# Patient Record
Sex: MALE | URBAN - METROPOLITAN AREA
[De-identification: names, ages, dates, MRNs, and addresses within clinical notes are randomized per-mention and may not be internally consistent; named-entity substitution may affect disease eponyms.]

---

## 2023-02-03 ENCOUNTER — OFFICE VISIT (OUTPATIENT)
Dept: URBAN - METROPOLITAN AREA CLINIC 96 | Facility: CLINIC | Age: 43
End: 2023-02-03
Payer: COMMERCIAL

## 2023-02-03 ENCOUNTER — WEB ENCOUNTER (OUTPATIENT)
Dept: URBAN - METROPOLITAN AREA CLINIC 96 | Facility: CLINIC | Age: 43
End: 2023-02-03

## 2023-02-03 VITALS
WEIGHT: 156 LBS | BODY MASS INDEX: 21.13 KG/M2 | DIASTOLIC BLOOD PRESSURE: 86 MMHG | SYSTOLIC BLOOD PRESSURE: 132 MMHG | HEART RATE: 72 BPM | HEIGHT: 72 IN | TEMPERATURE: 98.7 F

## 2023-02-03 DIAGNOSIS — R19.4 CHANGE IN BOWEL HABITS: ICD-10-CM

## 2023-02-03 DIAGNOSIS — D64.9 NORMOCYTIC ANEMIA: ICD-10-CM

## 2023-02-03 DIAGNOSIS — Z83.71 FAMILY HISTORY OF COLONIC POLYPS: ICD-10-CM

## 2023-02-03 DIAGNOSIS — R10.32 LEFT LOWER QUADRANT ABDOMINAL PAIN: ICD-10-CM

## 2023-02-03 DIAGNOSIS — R93.5 ABNORMAL CT OF THE ABDOMEN: ICD-10-CM

## 2023-02-03 DIAGNOSIS — R19.8 IRREGULAR BOWEL HABITS: ICD-10-CM

## 2023-02-03 PROCEDURE — 99204 OFFICE O/P NEW MOD 45 MIN: CPT | Performed by: INTERNAL MEDICINE

## 2023-02-03 RX ORDER — SODIUM, POTASSIUM,MAG SULFATES 17.5-3.13G
177 ML SOLUTION, RECONSTITUTED, ORAL ORAL AS DIRECTED
Qty: 1 BOX | Refills: 0 | OUTPATIENT
Start: 2023-02-03 | End: 2023-02-04

## 2023-02-03 RX ORDER — DICYCLOMINE HYDROCHLORIDE 10 MG/1
1 CAPSULES CAPSULE ORAL
Qty: 30 | Refills: 1 | OUTPATIENT

## 2023-02-03 NOTE — HPI-TODAY'S VISIT:
42 y.o. 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

## 2023-02-06 LAB
A/G RATIO: 2.5
ABSOLUTE BASOPHILS: 48
ABSOLUTE EOSINOPHILS: 127
ABSOLUTE LYMPHOCYTES: 2226
ABSOLUTE MONOCYTES: 641
ABSOLUTE NEUTROPHILS: 2258
ALBUMIN: 5
ALKALINE PHOSPHATASE: 42
ALT (SGPT): 15
AST (SGOT): 16
BASOPHILS: 0.9
BILIRUBIN, TOTAL: 0.5
BUN/CREATININE RATIO: (no result)
BUN: 17
C-REACTIVE PROTEIN, QUANT: 0.3
CALCIUM: 9.9
CARBON DIOXIDE, TOTAL: 30
CHLORIDE: 103
CREATININE: 0.82
EGFR: 113
EOSINOPHILS: 2.4
FERRITIN, SERUM: 78
GLOBULIN, TOTAL: 2
GLUCOSE: 87
HEMATOCRIT: 36.5
HEMOGLOBIN: 12.7
LYMPHOCYTES: 42
MCH: 30.1
MCHC: 34.8
MCV: 86.5
MONOCYTES: 12.1
MPV: 9
NEUTROPHILS: 42.6
PLATELET COUNT: 462
POTASSIUM: 4.2
PROTEIN, TOTAL: 7
RDW: 11.4
RED BLOOD CELL COUNT: 4.22
SODIUM: 142
VITAMIN B12: 497
WHITE BLOOD CELL COUNT: 5.3

## 2023-03-06 ENCOUNTER — OFFICE VISIT (OUTPATIENT)
Dept: URBAN - METROPOLITAN AREA CLINIC 96 | Facility: CLINIC | Age: 43
End: 2023-03-06

## 2023-03-08 ENCOUNTER — OFFICE VISIT (OUTPATIENT)
Dept: URBAN - METROPOLITAN AREA SURGERY CENTER 18 | Facility: SURGERY CENTER | Age: 43
End: 2023-03-08
Payer: COMMERCIAL

## 2023-03-08 ENCOUNTER — CLAIMS CREATED FROM THE CLAIM WINDOW (OUTPATIENT)
Dept: URBAN - METROPOLITAN AREA CLINIC 4 | Facility: CLINIC | Age: 43
End: 2023-03-08
Payer: COMMERCIAL

## 2023-03-08 DIAGNOSIS — R93.3 ABN FINDINGS-GI TRACT: ICD-10-CM

## 2023-03-08 DIAGNOSIS — Z83.71 FAMILY HISTORY OF COLON POLYPS: ICD-10-CM

## 2023-03-08 DIAGNOSIS — D12.3 ADENOMA OF TRANSVERSE COLON: ICD-10-CM

## 2023-03-08 DIAGNOSIS — D12.0 ADENOMA OF CECUM: ICD-10-CM

## 2023-03-08 DIAGNOSIS — D12.0 BENIGN NEOPLASM OF CECUM: ICD-10-CM

## 2023-03-08 PROCEDURE — 88305 TISSUE EXAM BY PATHOLOGIST: CPT | Performed by: PATHOLOGY

## 2023-03-08 PROCEDURE — G8907 PT DOC NO EVENTS ON DISCHARG: HCPCS | Performed by: INTERNAL MEDICINE

## 2023-03-08 PROCEDURE — 45385 COLONOSCOPY W/LESION REMOVAL: CPT | Performed by: INTERNAL MEDICINE

## 2023-03-08 RX ORDER — DICYCLOMINE HYDROCHLORIDE 10 MG/1
1 CAPSULES CAPSULE ORAL
Qty: 30 | Refills: 1 | Status: ACTIVE | COMMUNITY

## 2023-03-29 ENCOUNTER — OFFICE VISIT (OUTPATIENT)
Dept: URBAN - METROPOLITAN AREA CLINIC 98 | Facility: CLINIC | Age: 43
End: 2023-03-29
Payer: COMMERCIAL

## 2023-03-29 ENCOUNTER — TELEPHONE ENCOUNTER (OUTPATIENT)
Dept: URBAN - METROPOLITAN AREA CLINIC 35 | Facility: CLINIC | Age: 43
End: 2023-03-29

## 2023-03-29 ENCOUNTER — DASHBOARD ENCOUNTERS (OUTPATIENT)
Age: 43
End: 2023-03-29

## 2023-03-29 VITALS
SYSTOLIC BLOOD PRESSURE: 99 MMHG | HEART RATE: 63 BPM | DIASTOLIC BLOOD PRESSURE: 61 MMHG | TEMPERATURE: 98.1 F | BODY MASS INDEX: 22.66 KG/M2 | HEIGHT: 69 IN | WEIGHT: 153 LBS

## 2023-03-29 DIAGNOSIS — K57.90 DIVERTICULOSIS: ICD-10-CM

## 2023-03-29 DIAGNOSIS — D12.6 TUBULAR ADENOMA OF COLON: ICD-10-CM

## 2023-03-29 PROBLEM — 397881000: Status: ACTIVE | Noted: 2023-03-29

## 2023-03-29 PROCEDURE — G2012 BRIEF CHECK IN BY MD/QHP: HCPCS

## 2023-03-29 NOTE — HPI-TODAY'S VISIT:
Patient is a 42-year-old male who presents to follow-up from recent colonoscopy completed 3/8/2023 with Dr. Giordano  Findings included 1, 4 mm polyp in the cecum.  1, 13 mm polyp in the distal transverse colon.  Moderate diverticulosis in the entire examined colon.  Recommended repeat colonoscopy in 3 years.  Pathology results revealed tubular adenomas in both cecum and transverse colon. Bms are regular Denies BRBPR or melena Denies heartburn or indigestion